# Patient Record
Sex: MALE | Race: OTHER | NOT HISPANIC OR LATINO | URBAN - METROPOLITAN AREA
[De-identification: names, ages, dates, MRNs, and addresses within clinical notes are randomized per-mention and may not be internally consistent; named-entity substitution may affect disease eponyms.]

---

## 2022-11-15 ENCOUNTER — EMERGENCY (EMERGENCY)
Facility: HOSPITAL | Age: 32
LOS: 1 days | Discharge: HOME | End: 2022-11-15
Attending: EMERGENCY MEDICINE
Payer: COMMERCIAL

## 2022-11-15 VITALS
OXYGEN SATURATION: 98 % | HEIGHT: 66 IN | WEIGHT: 134.92 LBS | DIASTOLIC BLOOD PRESSURE: 81 MMHG | SYSTOLIC BLOOD PRESSURE: 117 MMHG | HEART RATE: 92 BPM | TEMPERATURE: 98 F | RESPIRATION RATE: 18 BRPM

## 2022-11-15 VITALS
RESPIRATION RATE: 20 BRPM | OXYGEN SATURATION: 98 % | DIASTOLIC BLOOD PRESSURE: 88 MMHG | HEART RATE: 87 BPM | SYSTOLIC BLOOD PRESSURE: 100 MMHG

## 2022-11-15 PROCEDURE — 99283 EMERGENCY DEPT VISIT LOW MDM: CPT

## 2022-11-15 PROCEDURE — 99284 EMERGENCY DEPT VISIT MOD MDM: CPT

## 2022-11-15 PROCEDURE — 99053 MED SERV 10PM-8AM 24 HR FAC: CPT

## 2022-11-15 RX ORDER — CYCLOBENZAPRINE HYDROCHLORIDE 10 MG/1
5 TABLET, FILM COATED ORAL ONCE
Refills: 0 | Status: COMPLETED | OUTPATIENT
Start: 2022-11-15 | End: 2022-11-15

## 2022-11-15 RX ORDER — LIDOCAINE 4 G/100G
1 CREAM TOPICAL ONCE
Refills: 0 | Status: COMPLETED | OUTPATIENT
Start: 2022-11-15 | End: 2022-11-15

## 2022-11-15 RX ADMIN — LIDOCAINE 1 PATCH: 4 CREAM TOPICAL at 06:28

## 2022-11-15 RX ADMIN — CYCLOBENZAPRINE HYDROCHLORIDE 5 MILLIGRAM(S): 10 TABLET, FILM COATED ORAL at 06:28
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
## 2022-11-15 NOTE — ED PROVIDER NOTE - NS ED ROS FT
General: denies fever, chills  HENT: denies nasal congestion, rhinorrhea  Eyes: denies visual changes, blurred vision  CV: denies chest pain, palpitations  Resp: denies difficulty breathing, cough  Abdominal: rectal pain  : denies urinary pain or discharge  MSK: scapula pain  Neuro: denies headaches, numbness, tingling  Skin: denies rashes, bruises

## 2022-11-15 NOTE — ED PROVIDER NOTE - PHYSICAL EXAMINATION
GENERAL: well appearing in no acute distress, non-toxic appearing  HEAD: normocephalic, atraumatic  HENT: airway intact; NO midline TTP; FROM of neck w/o pain  EYES: normal conjunctiva  CARDIAC: regular rate and rhythm, normal S1S2, no appreciable murmurs, 2+ pulses in UE/LE b/l  PULM: normal breath sounds, clear to ascultation bilaterally, no rales, rhonchi, wheezing  GI: abdomen nondistended, soft, nontender, no guarding, rebound tenderness; rectal exam (chaperoned by Dr. Cohn) noted no external or internal hemorrhoids or internal fluctuance or masses, no anal fissure  NEURO: no focal motor or sensory deficits  MSK: +right scapula pain; FROM of RUE  SKIN: well-perfused, extremities warm, no visible rashes
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
## 2022-11-15 NOTE — ED PROVIDER NOTE - PATIENT PORTAL LINK FT
You can access the FollowMyHealth Patient Portal offered by [facility] by registering at the following website: [email or web address]. By joining [employer]’s FollowMyHealth portal, you will also be able to view your health information using other applications (apps) compatible with our system.

## 2022-11-15 NOTE — ED PROVIDER NOTE - NSFOLLOWUPINSTRUCTIONS_ED_ALL_ED_FT
Discharge instructions:    - Please follow up with your Primary Care Doctor.    - Our discharge lounge will help expedite outpatient colorectal follow up.     - Tylenol up to 650 mg every 4-6hours as needed for pain and/or Motrin up to 600 mg every 6 hours as needed for pain. Take any prescribed medications as instructed:       SEEK IMMEDIATE MEDICAL CARE IF YOU HAVE ANY OF THE FOLLOWING SYMPTOMS: numbness, tingling, or weakness in your arms or legs, severe neck pain, changes in bowel or bladder control, shortness of breath, chest pain, blood in your urine/stool/vomit, headache, visual changes, lightheadedness/dizziness, or fainting.
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
## 2022-12-02 PROBLEM — Z00.00 ENCOUNTER FOR PREVENTIVE HEALTH EXAMINATION: Status: ACTIVE | Noted: 2022-12-02

## 2022-12-07 DIAGNOSIS — Z87.19 PERSONAL HISTORY OF OTHER DISEASES OF THE DIGESTIVE SYSTEM: ICD-10-CM

## 2022-12-09 ENCOUNTER — APPOINTMENT (OUTPATIENT)
Dept: SURGERY | Facility: CLINIC | Age: 32
End: 2022-12-09

## 2022-12-09 VITALS
TEMPERATURE: 97.2 F | RESPIRATION RATE: 16 BRPM | HEART RATE: 77 BPM | OXYGEN SATURATION: 97 % | BODY MASS INDEX: 20.89 KG/M2 | HEIGHT: 66 IN | DIASTOLIC BLOOD PRESSURE: 67 MMHG | SYSTOLIC BLOOD PRESSURE: 104 MMHG | WEIGHT: 130 LBS

## 2022-12-09 DIAGNOSIS — Z87.19 PERSONAL HISTORY OF OTHER DISEASES OF THE DIGESTIVE SYSTEM: ICD-10-CM

## 2022-12-09 PROCEDURE — 99203 OFFICE O/P NEW LOW 30 MIN: CPT | Mod: 25

## 2022-12-09 PROCEDURE — 46600 DIAGNOSTIC ANOSCOPY SPX: CPT

## 2022-12-09 NOTE — PHYSICAL EXAM
[Normal Breath Sounds] : Normal breath sounds [Normal Heart Sounds] : normal heart sounds [No Rash or Lesion] : No rash or lesion [Alert] : alert [Oriented to Person] : oriented to person [Oriented to Place] : oriented to place [Oriented to Time] : oriented to time [Calm] : calm [Abdomen Tenderness] : ~T No ~M abdominal tenderness [de-identified] : Perianal inspection and digital rectal examination are normal.  Anoscopy reveals friable circumferential prolapsing internal hemorrhoids with squamous metaplasia and evidence of recent bleeding.  Distal rectal mucosa was otherwise normal. [de-identified] : WNL [de-identified] : WNL [de-identified] : JAMEYL [de-identified] : WNL ROM [de-identified] : WNL

## 2022-12-09 NOTE — ASSESSMENT
[FreeTextEntry1] : In summary the patient has prolapsing bleeding internal hemorrhoids.  I recommended rubber band ligations explained the risks benefits and alternatives including the risks of bleeding and infection.  I explained the alternative would be a hemorrhoidectomy which would involve considerably more postoperative pain.  Once I have his colonoscopy report from [place] from last year and confirmed that this was otherwise normal he will follow-up for rubber band ligations.

## 2022-12-09 NOTE — HISTORY OF PRESENT ILLNESS
[FreeTextEntry1] : Holland is a 32yr. old male is here for consultation for possible hemorrhoids.\par \par Patient was seen [facility] ED on 11/15/22 for rectal bleeding and discomfort.  Patient has h/o fissure, hemorrhoids was prescribed with hydrocortisone cream.  BRBPR dripping into toilet/toilet paper when he went to ED.  Last episode of rectal bleeding was 11/16/22.  Prolapsing tissues or swelling, a grape size goes back itself when pushes back in for 3 years.  No episodes of incontinence of stool or flatus.  Good appetite.  No c/o nausea/vomiting.  Denies fever and chills.  Not on anticoagulants.  Patient had colonoscopy in [place] about a year ago.  No family history of colon cancer.

## 2022-12-16 ENCOUNTER — APPOINTMENT (OUTPATIENT)
Dept: SURGERY | Facility: CLINIC | Age: 32
End: 2022-12-16

## 2022-12-16 VITALS
DIASTOLIC BLOOD PRESSURE: 71 MMHG | HEART RATE: 97 BPM | SYSTOLIC BLOOD PRESSURE: 107 MMHG | HEIGHT: 66 IN | WEIGHT: 130 LBS | BODY MASS INDEX: 20.89 KG/M2 | TEMPERATURE: 97.4 F | RESPIRATION RATE: 16 BRPM | OXYGEN SATURATION: 94 %

## 2022-12-16 PROCEDURE — 99213 OFFICE O/P EST LOW 20 MIN: CPT | Mod: 25

## 2022-12-16 PROCEDURE — 46221 LIGATION OF HEMORRHOID(S): CPT

## 2022-12-16 NOTE — PHYSICAL EXAM
[Abdomen Tenderness] : ~T No ~M abdominal tenderness [Wheezing] : no wheezing was heard [Normal Rate and Rhythm] : normal rate and rhythm [No Rash or Lesion] : No rash or lesion [Alert] : alert [Calm] : calm [de-identified] : Perianal inspection digital rectal examination and anoscopy reveal enlarged circumferential friable prolapsing internal hemorrhoids with evidence of recent bleeding.  After the risks benefits and alternatives including the rare complications of bleeding and infection were explained rubber band ligations were performed in the right anterior right posterior and left lateral positions.  He tolerated the procedure well and a post procedure instruction sheet was given. [de-identified] : nad [de-identified] : nl

## 2022-12-16 NOTE — ASSESSMENT
[FreeTextEntry1] : In summary the patient has bleeding prolapsing internal hemorrhoids.  Rubber band ligations were performed in the office today.  I instructed him to avoid constipation and follow-up with me for additional rubber band ligations if his bleeding and prolapse recurs or persists.  Otherwise I only need to see him as needed

## 2022-12-16 NOTE — HISTORY OF PRESENT ILLNESS
[FreeTextEntry1] : Judah is a 32 years old male here for RBL.\par \par Colonoscopy 02/16/22 - Dr. Rob Uriarte = anal fissure in the anus.  Internal hemorrhoids.  Polyp (5 mm) in the rectosigmoid junction.  (Polypectomy).\par \par Last seen 12/09/22 - In summary the patient has prolapsing bleeding internal hemorrhoids. I recommended rubber band ligations explained the risks benefits and alternatives including the risks of bleeding and infection. I explained the alternative would be a hemorrhoidectomy which would involve considerably more postoperative pain. Once I have his colonoscopy report from [place] from last year and confirmed that this was otherwise normal he will follow-up for rubber band ligations. \par \par Today pt reports no pain. Daily BMs, hard, with straining, with pain, with some bleeding on tp and dripping on bowl (sometimes has blood seeping through clothes), no episodes of incontinence, and does have prolapsed tissue for about 3 years that pt is able to push back in. Denies nausea and vomiting. Denies fever and chills. Fair appetite. Not taking any anticoagulants. \par

## 2023-01-20 ENCOUNTER — APPOINTMENT (OUTPATIENT)
Dept: SURGERY | Facility: CLINIC | Age: 33
End: 2023-01-20
Payer: MEDICAID

## 2023-01-22 ENCOUNTER — EMERGENCY (EMERGENCY)
Facility: HOSPITAL | Age: 33
LOS: 1 days | Discharge: HOME | End: 2023-01-22
Attending: EMERGENCY MEDICINE
Payer: MEDICAID

## 2023-01-22 VITALS
RESPIRATION RATE: 18 BRPM | SYSTOLIC BLOOD PRESSURE: 105 MMHG | OXYGEN SATURATION: 100 % | TEMPERATURE: 98 F | DIASTOLIC BLOOD PRESSURE: 67 MMHG | HEART RATE: 73 BPM

## 2023-01-22 VITALS
WEIGHT: 130.07 LBS | OXYGEN SATURATION: 100 % | DIASTOLIC BLOOD PRESSURE: 71 MMHG | RESPIRATION RATE: 18 BRPM | HEIGHT: 66 IN | TEMPERATURE: 98 F | SYSTOLIC BLOOD PRESSURE: 100 MMHG | HEART RATE: 72 BPM

## 2023-01-22 DIAGNOSIS — Z87.19 PERSONAL HISTORY OF OTHER DISEASES OF THE DIGESTIVE SYSTEM: Chronic | ICD-10-CM

## 2023-01-22 LAB
ALBUMIN SERPL ELPH-MCNC: 3.9 G/DL — SIGNIFICANT CHANGE UP (ref 3.3–5)
ALP SERPL-CCNC: 59 U/L — SIGNIFICANT CHANGE UP (ref 40–120)
ALT FLD-CCNC: 13 U/L — SIGNIFICANT CHANGE UP (ref 10–45)
ANION GAP SERPL CALC-SCNC: 11 MMOL/L — SIGNIFICANT CHANGE UP (ref 5–17)
AST SERPL-CCNC: 18 U/L — SIGNIFICANT CHANGE UP (ref 10–40)
BASOPHILS # BLD AUTO: 0 K/UL — SIGNIFICANT CHANGE UP (ref 0–0.2)
BASOPHILS NFR BLD AUTO: 0 % — SIGNIFICANT CHANGE UP (ref 0–2)
BILIRUB SERPL-MCNC: 0.2 MG/DL — SIGNIFICANT CHANGE UP (ref 0.2–1.2)
BUN SERPL-MCNC: 13 MG/DL — SIGNIFICANT CHANGE UP (ref 7–23)
CALCIUM SERPL-MCNC: 9.1 MG/DL — SIGNIFICANT CHANGE UP (ref 8.4–10.5)
CHLORIDE SERPL-SCNC: 100 MMOL/L — SIGNIFICANT CHANGE UP (ref 96–108)
CO2 SERPL-SCNC: 27 MMOL/L — SIGNIFICANT CHANGE UP (ref 22–31)
CREAT SERPL-MCNC: 0.97 MG/DL — SIGNIFICANT CHANGE UP (ref 0.5–1.3)
EGFR: 107 ML/MIN/1.73M2 — SIGNIFICANT CHANGE UP
EOSINOPHIL # BLD AUTO: 0.05 K/UL — SIGNIFICANT CHANGE UP (ref 0–0.5)
EOSINOPHIL NFR BLD AUTO: 0.9 % — SIGNIFICANT CHANGE UP (ref 0–6)
FLUAV AG NPH QL: SIGNIFICANT CHANGE UP
FLUBV AG NPH QL: SIGNIFICANT CHANGE UP
GLUCOSE SERPL-MCNC: 106 MG/DL — HIGH (ref 70–99)
HCT VFR BLD CALC: 35.8 % — LOW (ref 39–50)
HGB BLD-MCNC: 12.2 G/DL — LOW (ref 13–17)
LIDOCAIN IGE QN: 15 U/L — SIGNIFICANT CHANGE UP (ref 7–60)
LYMPHOCYTES # BLD AUTO: 1.48 K/UL — SIGNIFICANT CHANGE UP (ref 1–3.3)
LYMPHOCYTES # BLD AUTO: 29.5 % — SIGNIFICANT CHANGE UP (ref 13–44)
MANUAL SMEAR VERIFICATION: SIGNIFICANT CHANGE UP
MCHC RBC-ENTMCNC: 30.7 PG — SIGNIFICANT CHANGE UP (ref 27–34)
MCHC RBC-ENTMCNC: 34.1 GM/DL — SIGNIFICANT CHANGE UP (ref 32–36)
MCV RBC AUTO: 90.2 FL — SIGNIFICANT CHANGE UP (ref 80–100)
MONOCYTES # BLD AUTO: 0.31 K/UL — SIGNIFICANT CHANGE UP (ref 0–0.9)
MONOCYTES NFR BLD AUTO: 6.1 % — SIGNIFICANT CHANGE UP (ref 2–14)
NEUTROPHILS # BLD AUTO: 3.19 K/UL — SIGNIFICANT CHANGE UP (ref 1.8–7.4)
NEUTROPHILS NFR BLD AUTO: 63.5 % — SIGNIFICANT CHANGE UP (ref 43–77)
OB PNL STL: NEGATIVE — SIGNIFICANT CHANGE UP
PLAT MORPH BLD: NORMAL — SIGNIFICANT CHANGE UP
PLATELET # BLD AUTO: 194 K/UL — SIGNIFICANT CHANGE UP (ref 150–400)
POTASSIUM SERPL-MCNC: 3.9 MMOL/L — SIGNIFICANT CHANGE UP (ref 3.5–5.3)
POTASSIUM SERPL-SCNC: 3.9 MMOL/L — SIGNIFICANT CHANGE UP (ref 3.5–5.3)
PROT SERPL-MCNC: 6.8 G/DL — SIGNIFICANT CHANGE UP (ref 6–8.3)
RBC # BLD: 3.97 M/UL — LOW (ref 4.2–5.8)
RBC # FLD: 13 % — SIGNIFICANT CHANGE UP (ref 10.3–14.5)
RBC BLD AUTO: SIGNIFICANT CHANGE UP
RSV RNA NPH QL NAA+NON-PROBE: SIGNIFICANT CHANGE UP
SARS-COV-2 RNA SPEC QL NAA+PROBE: SIGNIFICANT CHANGE UP
SODIUM SERPL-SCNC: 138 MMOL/L — SIGNIFICANT CHANGE UP (ref 135–145)
WBC # BLD: 5.03 K/UL — SIGNIFICANT CHANGE UP (ref 3.8–10.5)
WBC # FLD AUTO: 5.03 K/UL — SIGNIFICANT CHANGE UP (ref 3.8–10.5)

## 2023-01-22 PROCEDURE — 83690 ASSAY OF LIPASE: CPT

## 2023-01-22 PROCEDURE — 87637 SARSCOV2&INF A&B&RSV AMP PRB: CPT

## 2023-01-22 PROCEDURE — 96374 THER/PROPH/DIAG INJ IV PUSH: CPT

## 2023-01-22 PROCEDURE — 80053 COMPREHEN METABOLIC PANEL: CPT

## 2023-01-22 PROCEDURE — 36415 COLL VENOUS BLD VENIPUNCTURE: CPT

## 2023-01-22 PROCEDURE — 99284 EMERGENCY DEPT VISIT MOD MDM: CPT | Mod: 25

## 2023-01-22 PROCEDURE — 82272 OCCULT BLD FECES 1-3 TESTS: CPT

## 2023-01-22 PROCEDURE — 85025 COMPLETE CBC W/AUTO DIFF WBC: CPT

## 2023-01-22 PROCEDURE — 99284 EMERGENCY DEPT VISIT MOD MDM: CPT

## 2023-01-22 RX ORDER — ACETAMINOPHEN 500 MG
975 TABLET ORAL ONCE
Refills: 0 | Status: COMPLETED | OUTPATIENT
Start: 2023-01-22 | End: 2023-01-22

## 2023-01-22 RX ORDER — FAMOTIDINE 10 MG/ML
20 INJECTION INTRAVENOUS ONCE
Refills: 0 | Status: COMPLETED | OUTPATIENT
Start: 2023-01-22 | End: 2023-01-22

## 2023-01-22 RX ADMIN — Medication 30 MILLILITER(S): at 17:03

## 2023-01-22 RX ADMIN — FAMOTIDINE 20 MILLIGRAM(S): 10 INJECTION INTRAVENOUS at 17:04

## 2023-01-22 RX ADMIN — Medication 975 MILLIGRAM(S): at 17:02

## 2023-01-22 NOTE — ED ADULT NURSE NOTE - OBJECTIVE STATEMENT
32M, AAO4, c/o abdominal pain, n/v, beginning Friday. Reports dark stool this AM, has taken pepto bismol yesterday. No active n/v, denies fever/chills. Speaking clear in full sentences, RR even and unlabored. Moving all extremities, skin appropriate for age and race. Abdomen soft, non distended.

## 2023-01-22 NOTE — ED PROVIDER NOTE - CLINICAL SUMMARY MEDICAL DECISION MAKING FREE TEXT BOX
32-year-old male 3 weeks status post rubber band ligation for internal hemorrhoids presenting with 2 days of abdominal pain associated with black diarrhea and vomiting that has improved this morning.  Afebrile, hemodynamically stable here with no internal or external hemorrhoids palpable on exam, no black stool visible on rectal exam.  Mild abdominal tenderness on exam    Concern for bleeding from hemorrhoid band ligation, patient was told that he may need repeat band ligation.  We will check to make sure he is not anemic, otherwise if patient is feeling well, be discharged with outpatient follow-up will give meds for gastritis/gastroenteritis.

## 2023-01-22 NOTE — ED PROVIDER NOTE - PHYSICAL EXAMINATION
GENERAL: well appearing in no acute distress, non-toxic appearing  HEAD: normocephalic, atraumatic  HEENT: normal conjunctiva, oral mucosa moist,  CARDIAC: regular rate and rhythm, no appreciable murmurs, 2+ pulses in UE/LE b/l  PULM: normal breath sounds, clear to ascultation bilaterally, no rales, rhonchi, wheezing  GI: abdomen nondistended, soft, mild tenderness in RUQ, no guarding, rebound tenderness  : rectal exam done with chaperone Jair Ca MD: no external or internal hemorrhoids, no melena, rectal tone intact   NEURO: no focal motor or sensory deficits,  AAOx3  MSK: no peripheral edema, no calf tenderness b/l  SKIN: well-perfused, extremities warm, no visible rashes  PSYCH: appropriate mood and affect

## 2023-01-22 NOTE — ED PROVIDER NOTE - PATIENT PORTAL LINK FT
You can access the FollowMyHealth Patient Portal offered by [facility] by registering at the following website: [email or web address]. By joining [employer]’s FollowMyHealth portal, you will also be able to view your health information using other applications (apps) compatible with our system.

## 2023-01-22 NOTE — ED PROVIDER NOTE - PROGRESS NOTE DETAILS
Alexsandra Patino, PGY1, MD: pt hgb 12, informed of results and to follow up outpatient, guaiac negative. pt reports feeling improved. all findings discussed with patient and all questions answered. will dc home

## 2023-01-22 NOTE — ED PROVIDER NOTE - OBJECTIVE STATEMENT
32-year-old male 3 weeks status post rubber band ligation for internal hemorrhoids presenting with 2 days of abdominal pain associated with black diarrhea and vomiting that has improved this morning.  Patient was seen in urgent care today to make sure he was okay and he was sent in for evaluation of his black diarrhea.  Patient endorses taking a lot of Pepto-Bismol for his abdominal pain.  The patient reports that over the last 2 days he had fever and chills, however reports that he has not had any fever since waking up this morning.  Patient reports overall feeling better with improvement of his nausea and decreased diarrhea.  No shortness of breath, dizziness, exertional dyspnea, chest pain, weakness.  Patient is tolerating p.o. without issue

## 2023-01-22 NOTE — ED PROVIDER NOTE - NSFOLLOWUPINSTRUCTIONS_ED_ALL_ED_FT
Acute Abdominal Pain    WHAT YOU NEED TO KNOW:  The cause of your abdominal pain may not be found. If a cause is found, treatment will depend on what the cause is.    DISCHARGE INSTRUCTIONS:    Seek care immediately if:  You vomit blood or cannot stop vomiting.  You have blood in your bowel movement or it looks like tar.  You have bleeding from your rectum.  Your abdomen is larger than usual, more painful, and hard.  You have severe pain in your abdomen.  You stop passing gas and having bowel movements.  You feel weak, dizzy, or faint.  Contact your healthcare provider if:  You have a fever.  You have new signs and symptoms.  Your symptoms do not get better with treatment.  You have questions or concerns about your condition or care.  Medicines may be given to decrease pain, treat an infection, and manage your symptoms. Take your medicine as directed. Call your healthcare provider if you think your medicine is not helping or if you have side effects. Tell him if you are allergic to any medicine. Keep a list of the medicines, vitamins, and herbs you take. Include the amounts, and when and why you take them. Bring the list or the pill bottles to follow-up visits. Carry your medicine list with you in case of an emergency.    Manage your symptoms:    Apply heat on your abdomen for 20 to 30 minutes every 2 hours for as many days as directed. Heat helps decrease pain and muscle spasms.  Manage your stress. Stress may cause abdominal pain. Your healthcare provider may recommend relaxation techniques and deep breathing exercises to help decrease your stress. Your healthcare provider may recommend you talk to someone about your stress or anxiety, such as a counselor or a trusted friend. Get plenty of sleep and exercise regularly.  Limit or do not drink alcohol. Alcohol can make your abdominal pain worse. Ask your healthcare provider if it is safe for you to drink alcohol. Also ask how much is safe for you to drink.  Do not smoke. Nicotine and other chemicals in cigarettes can damage your esophagus and stomach. Ask your healthcare provider for information if you currently smoke and need help to quit. E-cigarettes or smokeless tobacco still contain nicotine. Talk to your healthcare provider before you use these products.  Make changes to the food you eat as directed: Do not eat foods that cause abdominal pain or other symptoms. Eat small meals more often.  Eat more high-fiber foods if you are constipated. High-fiber foods include fruits, vegetables, whole-grain foods, and legumes.  Do not eat foods that cause gas if you have bloating. Examples include broccoli, cabbage, and cauliflower. Do not drink soda or carbonated drinks, because these may also cause gas.  Do not eat foods or drinks that contain sorbitol or fructose if you have diarrhea and bloating. Some examples are fruit juices, candy, jelly, and sugar-free gum.  Do not eat high-fat foods, such as fried foods, cheeseburgers, hot dogs, and desserts.  Limit or do not drink caffeine. Caffeine may make symptoms, such as heart burn or nausea, worse.  Drink plenty of liquids to prevent dehydration from diarrhea or vomiting. Ask your healthcare provider how much liquid to drink each day and which liquids are best for you.  Follow up with your healthcare provider as directed: Write down your questions so you remember to ask them during your visits.  Dolor abdominal    LO QUE NECESITA SABER:    El dolor abdominal puede ser sordo, molesto, o beltran. Usted puede sentir dolor localizado en deb joseph área del abdomen o en todo el abdomen. El dolor puede ser causado por deb afección mahendra estreñimiento, sensibilidad o intoxicación alimentaria, infección o deb obstrucción. Asimismo, el dolor abdominal puede deberse a deb hernia, apendicitis o deb úlcera. Las enfermedades del hígado, la vesícula o el riñón también pueden causar dolor abdominal. La causa del dolor abdominal puede ser desconocida.    INSTRUCCIONES SOBRE EL RADHIKA HOSPITALARIA:    Regrese a la julissa de emergencias si:  Usted comienza a sentir un dolor en el pecho o dificultad para respirar que antes no sentía.  Usted siente un dolor con pulsaciones en la parte superior del abdomen o en la parte inferior de la espalda que de repente se vuelve ana.  El dolor se localiza en la parte inferior derecha del abdomen y empeora cuando se mueve.  Usted tiene fiebre por encima de los 100.4 °F (38 °C) o escalofríos.  Usted tiene vómitos y no puede retener líquidos ni alimentos en el estómago.  El dolor no mejora o empeora en las próximas 8 a 12 horas.  Usted nota shonna en gracia vómito o heces, o éstas tienen un aspecto negruzco y alquitranado.  Gracia piel o las partes kizzy de carin ojos se vuelven amarillentas.  Si usted es deb purnima y presenta abundante sangrado vaginal que no es gracia menstruación.  Comuníquese con gracia médico si:  Usted siente dolor en la parte inferior de la espalda.  Usted es varón y tiene dolor en los testículos.  Siente dolor al orinar.  Usted tiene preguntas o inquietudes acerca de gracia condición o cuidado.  Acuda en 24 horas a deb willi de seguimiento con gracia médico o mahendra se le indique:Anote carin preguntas para que se acuerde de hacerlas sreedhar carin visitas.    Medicamentos:  Los medicamentospueden ser administrados para calmar gracia estómago y prevenir los vómitos o para disminuir el dolor. Pregunte cómo se debe maikol los analgésicos de forma loza.  Section carin medicamentos mahendra se le haya indicado.Consulte con gracia médico si usted sabina que gracia medicamento no le está ayudando o si presenta efectos secundarios. Infórmele si es alérgico a algún medicamento. Mantenga deb lista actualizada de los medicamentos, las vitaminas y los productos herbales que michelle. Incluya los siguientes datos de los medicamentos: cantidad, frecuencia y motivo de administración. Traiga con usted la lista o los envases de las píldoras a carin citas de seguimiento. Lleve la lista de los medicamentos con usted en bao de deb emergencia.    Acute Nausea and Vomiting  WHAT YOU NEED TO KNOW:  Acute nausea and vomiting start suddenly, worsen quickly, and last a short time.  DISCHARGE INSTRUCTIONS:  Return to the emergency department if:  You see blood in your vomit or your bowel movements.  You have sudden, severe pain in your chest and upper abdomen after hard vomiting or retching.  You have swelling in your neck and chest.  You are dizzy, cold, and thirsty and your eyes and mouth are dry.  You are urinating very little or not at all.  You have muscle weakness, leg cramps, and trouble breathing.  Your heart is beating much faster than normal.  You continue to vomit for more than 48 hours.  Contact your healthcare provider if:  You have frequent dry heaves (vomiting but nothing comes out).  Your nausea and vomiting does not get better or go away after you use medicine.  You have questions or concerns about your condition or treatment.  Medicines: You may need any of the following:  Medicines may be given to calm your stomach and stop your vomiting. You may also need medicines to help you feel more relaxed or to stop nausea and vomiting caused by motion sickness.  Gastrointestinal stimulants are used to help empty your stomach and bowels. This may help decrease nausea and vomiting.  Take your medicine as directed. Contact your healthcare provider if you think your medicine is not helping or if you have side effects. Tell him or her if you are allergic to any medicine. Keep a list of the medicines, vitamins, and herbs you take. Include the amounts, and when and why you take them. Bring the list or the pill bottles to follow-up visits. Carry your medicine list with you in case of an emergency.  Prevent or manage acute nausea and vomiting:  Do not drink alcohol. Alcohol may upset or irritate your stomach. Too much alcohol can also cause acute nausea and vomiting.  Control stress. Headaches due to stress may cause nausea and vomiting. Find ways to relax and manage your stress. Get more rest and sleep.  Drink more liquids as directed. Vomiting can lead to dehydration. It is important to drink more liquids to help replace lost body fluids. Ask your healthcare provider how much liquid to drink each day and which liquids are best for you. Your provider may recommend that you drink an oral rehydration solution (ORS). ORS contains water, salts, and sugar that are needed to replace the lost body fluids. Ask what kind of ORS to use, how much to drink, and where to get it.  Eat smaller meals, more often. Eat small amounts of food every 2 to 3 hours, even if you are not hungry. Food in your stomach may decrease your nausea.  Talk to your healthcare provider before you take over-the-counter (OTC) medicines. These medicines can cause serious problems if you use certain other medicines, or you have a medical condition. You may have problems if you use too much or use them for longer than the label says. Follow directions on the label carefully.  Follow up with your healthcare provider as directed: Write down your questions so you remember to ask them during your follow-up visits.  Náuseas y vómitos agudos    LO QUE NECESITA SABER:  Las náuseas y los vómitos agudos comienzan de forma repentina, empeoran rápidamente y sotelo un período breve de tiempo.  INSTRUCCIONES SOBRE EL RAD[facility]KA HOSPITALARIA:  Busque atención médica de inmediato si:  Usted nota shonna en gracia vómito o en carin evacuaciones.  Usted siente un dolor súbito e intenso en el pecho y la parte superior de gracia abdomen después de tener vómitos carlos o tratar de vomitar.  Usted tiene el bri y el pecho inflamados.  Usted está mareado, tiene frío, sed y sequedad en los ojos y la boca.  Usted está orinando muy poco o nada en absoluto.  Usted tiene debilidad muscular, calambres en las piernas y dificultad para respirar.  Gracia corazón late más rápido que de costumbre.  Usted continúa vomitando por más de 48 horas.  Comuníquese con gracia médico si:  Usted tiene arcadas secas (vómitos sin que salga nada) frecuentes.  Carin náusea y vómitos no mejoran ni desaparecen después de usar el medicamento.  Usted tiene preguntas o inquietudes acerca de gracia condición o tratamiento.  Medicamentos:Es posible que usted necesite alguno de los siguientes:  Los medicamentosse puede administrar para calmarle el estómago y detener carin vómitos. Usted también puede necesitar medicamentos para ayudarlo a sentirse más relajado o para detener las náuseas y los vómitos causados por el mareo por movimiento.  Se usan los estimulantes gastrointestinalespara ayudar a vaciar gracia estómago y los intestinos. [place] puede ayudar para reducir las náuseas y el vómito.  Section carin medicamentos mahendra se le haya indicado.Consulte con gracia médico si usted sabina que gracia medicamento no le está ayudando o si presenta efectos secundarios. Infórmele si es alérgico a cualquier medicamento. Mantenga deb lista actualizada de los medicamentos, las vitaminas y los productos herbales que michelle. Incluya los siguientes datos de los medicamentos: cantidad, frecuencia y motivo de administración. Traiga con usted la lista o los envases de las píldoras a carin citas de seguimiento. Lleve la lista de los medicamentos con usted en bao de deb emergencia.  Evite o controle las náuseas y los vómitos agudos:  No consuma alcohol.El alcohol podría causarle malestar o irritación estomacal. Deb cantidad elevada de alcohol también puede causar náuseas y vómitos agudos.  Controle el estrés.Los kuldeep de claudette que son el resultado de tensión nerviosa pueden causar náuseas y vómitos. Busque la manera de relajarse y controlar el estrés. Descanse y duerma más.  Section más líquidos mahendra se le indique.Los vómitos pueden llevar a la deshidratación. Es importante beber más líquidos para ayudar a reemplazar los fluidos corporales perdidos. Pregunte a gracia médico sobre la cantidad de líquido que necesita maikol todos los kaylee y cuáles le recomienda. Gracia médico podría recomendarle que tome deb solución de rehidratación oral (SRO). Las soluciones de rehidratación oral contienen la cantidad adecuada de agua, sales y azúcar que necesita para restituir los líquidos que perdió gracia organismo. Pregunte qué tipo de solución de rehidratación oral debe usar, qué cantidad debe maikol y dónde puede obtenerla.  Ingiera comidas más pequeñas, más a menudo.Coma pequeñas cantidades de comida cada 2 o 3 horas, incluso si no tiene hambre. Carin náuseas podrían disminuir si tiene comida en el estómago.  Hable con gracia médico antes de maikol medicamentos de venta dru.Estos medicamentos pueden causar problemas serios si los usa junto con ciertos medicamentos o si tiene determinadas condiciones médicas. Podrían tener problemas si usa deb dosis demasiado radhika o los usa sreedhar más tiempo de lo indicado. Siga las indicaciones de la etiqueta al pie de la letra.  Acuda a carin consultas de control con gracia médico según le indicaron.Anote carin preguntas para que se acuerde de hacerlas sreedhar carin visitas.

## 2023-01-22 NOTE — ED PROVIDER NOTE - ATTENDING CONTRIBUTION TO CARE
Patient with a history of hemorrhoids that recently had a procedure performed.  He has been taking Pepto-Bismol and now his stool is black.  He went to urgent care and they sent him to the hospital to be evaluated for GI bleeding.  Nontender abdomen  I was at bedside when the resident did a rectal exam.  The stool was black on the finger and was sent to the lab  The black stool was guaiac negative.  Probably related to the Pepto-Bismol intake.  I advised the patient that he needs to follow-up with a repeat hemoglobin as hemoglobin was slightly decreased today.  Patient understands and will follow-up within a week.  If he has new or changing symptoms will return to ER

## 2023-02-03 ENCOUNTER — APPOINTMENT (OUTPATIENT)
Dept: SURGERY | Facility: CLINIC | Age: 33
End: 2023-02-03
Payer: MEDICAID

## 2023-02-03 VITALS
TEMPERATURE: 97.2 F | DIASTOLIC BLOOD PRESSURE: 77 MMHG | RESPIRATION RATE: 17 BRPM | HEART RATE: 120 BPM | OXYGEN SATURATION: 98 % | SYSTOLIC BLOOD PRESSURE: 130 MMHG

## 2023-02-03 DIAGNOSIS — K64.8 OTHER HEMORRHOIDS: ICD-10-CM

## 2023-02-03 PROCEDURE — 99213 OFFICE O/P EST LOW 20 MIN: CPT

## 2023-02-03 NOTE — PHYSICAL EXAM
[Abdomen Tenderness] : ~T No ~M abdominal tenderness [Wheezing] : no wheezing was heard [Normal Rate and Rhythm] : normal rate and rhythm [No Rash or Lesion] : No rash or lesion [Alert] : alert [Calm] : calm [de-identified] : Perianal inspection reveals enlarged prolapsing internal hemorrhoids in the right posterior and left posterior positions.  There are no enlarged external hemorrhoids.  After risks benefits and alternatives were explained rubber band ligations were performed in the right posterior and left posterior positions.  He tolerated the procedure well. [de-identified] : nad [de-identified] : nl

## 2023-02-03 NOTE — ASSESSMENT
[FreeTextEntry1] : In summary the patient has bleeding prolapsing internal hemorrhoids.  He noticed significant provement following rubber band ligations a month ago.  Rubber band ligations were performed in the office today.  I instructed him to avoid constipation and follow-up for additional rubber band ligations if his bleeding and prolapse recurs or persists.  Otherwise I only need to see him as needed.

## 2023-07-21 ENCOUNTER — APPOINTMENT (OUTPATIENT)
Dept: ORTHOPEDIC SURGERY | Facility: CLINIC | Age: 33
End: 2023-07-21
Payer: MEDICAID

## 2023-07-21 VITALS
BODY MASS INDEX: 20.89 KG/M2 | DIASTOLIC BLOOD PRESSURE: 65 MMHG | SYSTOLIC BLOOD PRESSURE: 100 MMHG | OXYGEN SATURATION: 98 % | HEIGHT: 66 IN | WEIGHT: 130 LBS | TEMPERATURE: 98.2 F | HEART RATE: 79 BPM

## 2023-07-21 DIAGNOSIS — M54.12 RADICULOPATHY, CERVICAL REGION: ICD-10-CM

## 2023-07-21 PROCEDURE — 99203 OFFICE O/P NEW LOW 30 MIN: CPT

## 2023-07-21 PROCEDURE — 72040 X-RAY EXAM NECK SPINE 2-3 VW: CPT

## 2023-07-21 NOTE — HISTORY OF PRESENT ILLNESS
[de-identified] : Mr. FRANCOIS YANG  is a 32 year old male who presents with a chronic history of right sided neck/trap/scapula and arm pain.  He is doing physical therapy and has taken muscle relaxers and nsaids for symptom control.  Normal bowel and bladder control.   Denies any recent fevers, chills, sweats, weight loss, or infection.\par \par The patients past medical history, past surgical history, medications, allergies, and social history were reviewed by me today with the patient and documented accordingly.  In addition, the patient's family history, which is noncontributory to their visit, was also reviewed.\par

## 2023-07-21 NOTE — DISCUSSION/SUMMARY
[de-identified] : We discussed further treatment options.  Given his failure to improve with physical therapy we will obtain a cervical MRI.  Follow-up afterwards.

## 2023-07-21 NOTE — PHYSICAL EXAM
[Antalgic] : not antalgic [Ataxic] : not ataxic [de-identified] : Examination of the cervical spine reveals no midline or paraspinal tenderness to palpation. No cervical lymphadenopathy. Decreased range of motion with respect to flexion, extension, rotation, and lateral bending. Negative Spurlings. Negative Lhermitte's. Full range of motion bilateral shoulders without evidence of impingement. No instability of bilateral upper extremities.  Cranial nerves II through XII grossly intact. Intact sensation bilateral upper extremities. 5/5 deltoids biceps triceps wrist extensors wrist flexors finger flexors and hand intrinsics. 1+ biceps triceps and brachioradialis reflexes. Negative Rojas's. 2+ radial pulse. Negative Tinel's over the cubital and carpal tunnel. No skin lesions on the right and left upper extremities. [de-identified] : AP lateral cervical x-rays with some mild spondylosis without instability or aggressive lesions.

## 2023-08-16 ENCOUNTER — APPOINTMENT (OUTPATIENT)
Dept: MRI IMAGING | Facility: CLINIC | Age: 33
End: 2023-08-16
Payer: MEDICAID

## 2023-08-16 ENCOUNTER — OUTPATIENT (OUTPATIENT)
Dept: OUTPATIENT SERVICES | Facility: HOSPITAL | Age: 33
LOS: 1 days | End: 2023-08-16
Payer: MEDICAID

## 2023-08-16 DIAGNOSIS — M54.12 RADICULOPATHY, CERVICAL REGION: ICD-10-CM

## 2023-08-16 DIAGNOSIS — Z87.19 PERSONAL HISTORY OF OTHER DISEASES OF THE DIGESTIVE SYSTEM: Chronic | ICD-10-CM

## 2023-08-16 PROCEDURE — 72141 MRI NECK SPINE W/O DYE: CPT

## 2023-08-16 PROCEDURE — 72141 MRI NECK SPINE W/O DYE: CPT | Mod: 26
